# Patient Record
(demographics unavailable — no encounter records)

---

## 2024-12-19 NOTE — ASSESSMENT
[Arterial/Venous Disease] : arterial/venous disease [Medication Management] : medication management [FreeTextEntry1] : 87-year-old female with severe peripheral vascular disease, status post recent right lower extremity revascularization in addition to stenting of the left common iliac artery  PVRs demonstrate mild to moderate tibial arterial disease in the right lower extremity. Moderate iliofemoral and severe tibial arterial disease is noted in the left lower extremity.  Patient is currently asymptomatic.  Patient to continue conservative management of peripheral vascular disease with aspirin and Xarelto.  Follow-up 3 months with PVR and aortic duplex.

## 2024-12-19 NOTE — PHYSICAL EXAM
[Normal Breath Sounds] : Normal breath sounds [Normal Heart Sounds] : normal heart sounds [2+] : left 2+ [0] : right 0 [No Rash or Lesion] : No rash or lesion [Alert] : alert [Calm] : calm [JVD] : no jugular venous distention  [Ankle Swelling (On Exam)] : not present [Abdomen Tenderness] : ~T ~M No abdominal tenderness [de-identified] : Appears well

## 2024-12-19 NOTE — HISTORY OF PRESENT ILLNESS
[FreeTextEntry1] : 87-year-old female with multiple medical problems including congestive heart failure, diabetes, atrial fibrillation, hypertension and hyperlipidemia patient with severe peripheral vascular disease, status post right lower extremity angioplasty and stent with poor tibial runoff.  Patient now status post recent right lower extremity revascularization and stenting of the left common iliac artery who presents for follow-up.

## 2025-01-13 NOTE — PHYSICAL EXAM
[TextEntry] : PULMONARY: No respiratory distress and lungs were clear to auscultation bilaterally. HEART: Heart rate was normal and rhythm regular, normal S1 and S2, no gallops/murmur/pericardial rub. VASCULAR: No peripheral edema. ABDOMEN: Normal bowel sounds, soft, non-tender, no hepatosplenomegaly and no abdominal mass palpated. NEUROLOGICAL: Poor balance and coordination noted, using cane for safety, no focal deficits.

## 2025-01-13 NOTE — HISTORY OF PRESENT ILLNESS
[de-identified] : 88 year old  female patient with history of stable Hypertension, Paroxysmal Atrial Fibrillation, Cardiomyopathy, CHF, Type 2 Diabetes Mellitus, Hyperlipidemia, PAD, history as stated, presented for follow up examination. Patient is compliant with all medications. ROS as stated.

## 2025-01-13 NOTE — ASSESSMENT
[FreeTextEntry1] : 88 year old female found to have stable Hypertension, Paroxysmal Atrial Fibrillation, Cardiomyopathy, CHF, Type 2 Diabetes Mellitus, Hyperlipidemia, PAD, with the current prescription regimen as recommended, diet and lifestyle modifications, as counseled. Prior results reviewed, interpreted and discussed with the patient during today's examination, as appropriate. Follow up, treatment plan and tests, as ordered.   EKG: Sinus Rhythm @ 63 BPM. Known incomplete RBBB, no new acute ST-T changes noted.  Total time spent:: 25 minutes Including: Preparation prior to visit - Reviewing prior record, results of tests and Consultation Reports as applicable Conducting an appropriate H & P during today's encounter Appropriate orders for tests, medications and procedures, as applicable Counseling patient Note completion

## 2025-01-13 NOTE — HISTORY OF PRESENT ILLNESS
[de-identified] : 88 year old  female patient with history of stable Hypertension, Paroxysmal Atrial Fibrillation, Cardiomyopathy, CHF, Type 2 Diabetes Mellitus, Hyperlipidemia, PAD, history as stated, presented for follow up examination. Patient is compliant with all medications. ROS as stated.

## 2025-04-01 NOTE — ASSESSMENT
[FreeTextEntry1] : 87-year-old female with severe peripheral vascular disease, status post recent right lower extremity revascularization in addition to stenting of the left common iliac artery  In the office today patient underwent lower extremity arterial Doppler which shows severe disease in the right lower extremity and it is significantly worse than the previous study.  Because of the severity of the pain and a diminished PVRs would recommend right lower extremity angiogram.   Patient to continue conservative management of peripheral vascular disease with aspirin and Xarelto.  [Arterial/Venous Disease] : arterial/venous disease [Medication Management] : medication management

## 2025-04-01 NOTE — HISTORY OF PRESENT ILLNESS
[FreeTextEntry1] : 88-year-old female with multiple medical problems including congestive heart failure, diabetes, atrial fibrillation, hypertension and hyperlipidemia patient with severe peripheral vascular disease, status post right lower extremity angioplasty and stent with poor tibial runoff.  Patient had been doing well until approximately 2 days ago when she suddenly developed pain in the right lower extremity with ambulation and at rest.  Patient noted that her right foot became somewhat cool.  She presents to the office today for evaluation.

## 2025-04-01 NOTE — PHYSICAL EXAM
[JVD] : no jugular venous distention  [Normal Breath Sounds] : Normal breath sounds [Normal Heart Sounds] : normal heart sounds [2+] : left 2+ [0] : right 0 [Ankle Swelling (On Exam)] : not present [Abdomen Tenderness] : ~T ~M No abdominal tenderness [No Rash or Lesion] : No rash or lesion [Alert] : alert [Calm] : calm [de-identified] : Appears well

## 2025-04-03 NOTE — HISTORY OF PRESENT ILLNESS
[FreeTextEntry1] : alert and oriented  ambulates with a cane accompanied by son Houston [phone] last radharelto  Cr: 0.72 4/1/25 [FreeTextEntry5] : yesterday at [FreeTextEntry6] :

## 2025-04-03 NOTE — PROCEDURE
[FreeTextEntry1] : Aortogram, right leg angiogram [Groin check for bleeding/hematoma, vitals checked, and Doppler/pulse checked on admission, then every 15 minutes for an hour and every 30 minutes for 3 hours thereafter.] : Groin check for bleeding/hematoma, vitals checked, and Doppler/pulse checked on admission, then every 15 minutes for an hour and every 30 minutes for 3 hours thereafter.  [Resume Diet] : resume diet [Discharge at _____] : Discharge at [unfilled]. [D/C IV on discharge] : D/C IV on discharge [Resume diet] : resume diet

## 2025-04-03 NOTE — PAST MEDICAL HISTORY
[Increasing age ( >40 years old)] : Increasing age ( >40 years old) [Altered mobility] : Altered mobility [Congestive Heart Failure] : Congestive Heart Failure [Malignancy] : Malignancy [No therapy indicated for cases scheduled for less than one hour] : No therapy indicated for cases scheduled for less than one hour. [FreeTextEntry1] : Malignant Hyperthermia Screening Tool and Risk of Bleeding Assessment \par  \par  ANA LUISA FELIPE  denies family of unexpected death following Anesthesia or Exercise.\par  Denies family history of Malignant Hyperthermia, Muscle or Neuromuscular disorder and High Temperature following exercise. \par  \par  ANA LUISA FELIPE Patient denies history of Muscle Spasm, Dark or Chocolate- Colored and unanticipated fever immediately following anesthesia or serious exercise. \par  \par  ANA LUISA FELIPE Patient also denies bleeding tendencies/risks of bleeding.

## 2025-04-03 NOTE — HISTORY OF PRESENT ILLNESS
[FreeTextEntry1] : alert and oriented  ambulates with a cane accompanied by son [place] [phone] last radharelto  Cr: 0.72 4/1/25 [FreeTextEntry5] : yesterday at [FreeTextEntry6] :

## 2025-04-03 NOTE — ASSESSMENT
[FreeTextEntry1] : 87-year-old female with severe peripheral vascular disease, status post recent right lower extremity revascularization in addition to stenting of the left common iliac artery  Patient underwent lower extremity arterial Doppler which shows severe disease in the right lower extremity and it is significantly worse than the previous study.  Because of the severity of the pain and a diminished PVRs, plan for right lower extremity angiogram.    [Arterial/Venous Disease] : arterial/venous disease

## 2025-06-18 NOTE — PHYSICAL EXAM
[JVD] : no jugular venous distention  [Normal Breath Sounds] : Normal breath sounds [Normal Heart Sounds] : normal heart sounds [2+] : left 2+ [0] : right 0 [Ankle Swelling (On Exam)] : not present

## 2025-06-18 NOTE — HISTORY OF PRESENT ILLNESS
[FreeTextEntry1] : Patient is an 88-year-old with history of peripheral vascular disease status post lower extremity endovascular intervention in the past.  Patient reports significant improvement in symptoms.  No rest pain or claudication.

## 2025-06-18 NOTE — ASSESSMENT
[FreeTextEntry1] : Patient with peripheral vascular disease status post lower extremity intervention.  Patient doing very well.  No significant rest pain.  Improved claudication.  No tissue loss.  Continue antiplatelet therapy.  Follow-up in 3 months with repeat duplex.

## 2025-07-08 NOTE — ASSESSMENT
[FreeTextEntry1] : 88 year old female found to have stable Hypertension, Paroxysmal Atrial Fibrillation, Cardiomyopathy, CHF, Type 2 Diabetes Mellitus, Hyperlipidemia, PAD, with the current prescription regimen as recommended, diet and lifestyle modifications, as counseled. Prior results reviewed, interpreted and discussed with the patient during today's examination, as appropriate. Follow up, treatment plan and tests, as ordered.   Total time spent:: 30 minutes Including: Preparation prior to visit - Reviewing prior record, results of tests and Consultation Reports as applicable Conducting an appropriate H & P during today's encounter Appropriate orders for tests, medications and procedures, as applicable Counseling patient Note completion

## 2025-07-08 NOTE — HISTORY OF PRESENT ILLNESS
[de-identified] : 88 year old  female patient with history of stable Hypertension, Paroxysmal Atrial Fibrillation, Cardiomyopathy, CHF, Type 2 Diabetes Mellitus, Hyperlipidemia, PAD, history as stated, presented for follow up examination. Patient is compliant with all medications. ROS as stated.

## 2025-07-08 NOTE — REVIEW OF SYSTEMS
[TextEntry] : CARDIOVASCULAR: Negative RESPIRATORY: Negative GASTROINTESTINAL: Negative NEUROLOGICAL: Negative [de-identified] : Right pinna of the ear, lobulated skin lesion, consistent with possible superficial skin cancer, Plastic Sx F/U, as counseled and directed.

## 2025-07-08 NOTE — HEALTH RISK ASSESSMENT
[No] : In the past 12 months have you used drugs other than those required for medical reasons? No [No falls in past year] : Patient reported no falls in the past year [0] : 2) Feeling down, depressed, or hopeless: Not at all (0) [Never] : Never [de-identified] : VASTODD SX [[id number]] : 0

## 2025-07-08 NOTE — HEALTH RISK ASSESSMENT
[No] : In the past 12 months have you used drugs other than those required for medical reasons? No [No falls in past year] : Patient reported no falls in the past year [0] : 2) Feeling down, depressed, or hopeless: Not at all (0) [Never] : Never [de-identified] : VASTODD SX [[id number]] : 0

## 2025-07-08 NOTE — HISTORY OF PRESENT ILLNESS
[de-identified] : 88 year old  female patient with history of stable Hypertension, Paroxysmal Atrial Fibrillation, Cardiomyopathy, CHF, Type 2 Diabetes Mellitus, Hyperlipidemia, PAD, history as stated, presented for follow up examination. Patient is compliant with all medications. ROS as stated.

## 2025-07-08 NOTE — REVIEW OF SYSTEMS
[TextEntry] : CARDIOVASCULAR: Negative RESPIRATORY: Negative GASTROINTESTINAL: Negative NEUROLOGICAL: Negative [de-identified] : Right pinna of the ear, lobulated skin lesion, consistent with possible superficial skin cancer, Plastic Sx F/U, as counseled and directed.